# Patient Record
Sex: MALE | Race: WHITE | NOT HISPANIC OR LATINO | Employment: UNEMPLOYED | URBAN - METROPOLITAN AREA
[De-identification: names, ages, dates, MRNs, and addresses within clinical notes are randomized per-mention and may not be internally consistent; named-entity substitution may affect disease eponyms.]

---

## 2023-05-31 ENCOUNTER — HOSPITAL ENCOUNTER (EMERGENCY)
Facility: HOSPITAL | Age: 18
Discharge: HOME | End: 2023-05-31
Attending: EMERGENCY MEDICINE
Payer: COMMERCIAL

## 2023-05-31 VITALS
BODY MASS INDEX: 24.75 KG/M2 | TEMPERATURE: 99 F | SYSTOLIC BLOOD PRESSURE: 129 MMHG | HEART RATE: 84 BPM | OXYGEN SATURATION: 99 % | DIASTOLIC BLOOD PRESSURE: 62 MMHG | HEIGHT: 64 IN | WEIGHT: 145 LBS | RESPIRATION RATE: 16 BRPM

## 2023-05-31 DIAGNOSIS — L57.8 DERMATITIS DUE TO SUNBURN: ICD-10-CM

## 2023-05-31 DIAGNOSIS — L03.119 CELLULITIS OF LOWER EXTREMITY, UNSPECIFIED LATERALITY: Primary | ICD-10-CM

## 2023-05-31 PROCEDURE — 25000003 PHARM REV CODE 250: Performed by: EMERGENCY MEDICINE

## 2023-05-31 PROCEDURE — 99283 EMERGENCY DEPT VISIT LOW MDM: CPT

## 2023-05-31 RX ORDER — DOXYCYCLINE HYCLATE 100 MG
100 TABLET ORAL
Status: COMPLETED | OUTPATIENT
Start: 2023-05-31 | End: 2023-05-31

## 2023-05-31 RX ORDER — DOXYCYCLINE 100 MG/1
100 CAPSULE ORAL 2 TIMES DAILY
Qty: 20 CAPSULE | Refills: 0 | Status: SHIPPED | OUTPATIENT
Start: 2023-05-31 | End: 2023-06-10

## 2023-05-31 RX ADMIN — DOXYCYCLINE HYCLATE 100 MG: 100 TABLET, COATED ORAL at 04:05

## 2023-05-31 NOTE — Clinical Note
"Pedro Pablo Diehl" Paty was seen and treated in our emergency department on 5/31/2023.  He may return to work on 06/14/2023.       If you have any questions or concerns, please don't hesitate to call.      Mel Harrell RN    "
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
## 2023-06-02 ENCOUNTER — HOSPITAL ENCOUNTER (OUTPATIENT)
Dept: WOUND CARE | Facility: HOSPITAL | Age: 18
Discharge: HOME | End: 2023-06-02
Attending: PREVENTIVE MEDICINE
Payer: COMMERCIAL

## 2023-06-02 VITALS
TEMPERATURE: 99 F | HEART RATE: 89 BPM | SYSTOLIC BLOOD PRESSURE: 121 MMHG | DIASTOLIC BLOOD PRESSURE: 79 MMHG | BODY MASS INDEX: 24.75 KG/M2 | WEIGHT: 145 LBS | HEIGHT: 64 IN

## 2023-06-02 DIAGNOSIS — L25.9 CONTACT DERMATITIS, UNSPECIFIED CONTACT DERMATITIS TYPE, UNSPECIFIED TRIGGER: ICD-10-CM

## 2023-06-02 DIAGNOSIS — L57.8 DERMATITIS DUE TO SUNBURN: Primary | ICD-10-CM

## 2023-06-02 PROCEDURE — 99204 OFFICE O/P NEW MOD 45 MIN: CPT
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
## 2023-06-09 ENCOUNTER — HOSPITAL ENCOUNTER (OUTPATIENT)
Dept: WOUND CARE | Facility: HOSPITAL | Age: 18
Discharge: HOME | End: 2023-06-09
Attending: PREVENTIVE MEDICINE
Payer: COMMERCIAL

## 2023-06-09 VITALS
DIASTOLIC BLOOD PRESSURE: 59 MMHG | HEART RATE: 90 BPM | HEIGHT: 64 IN | WEIGHT: 145 LBS | TEMPERATURE: 96 F | SYSTOLIC BLOOD PRESSURE: 115 MMHG | BODY MASS INDEX: 24.75 KG/M2

## 2023-06-09 DIAGNOSIS — L25.9 CONTACT DERMATITIS, UNSPECIFIED CONTACT DERMATITIS TYPE, UNSPECIFIED TRIGGER: ICD-10-CM

## 2023-06-09 DIAGNOSIS — L57.8 DERMATITIS DUE TO SUNBURN: Primary | ICD-10-CM

## 2023-06-09 PROCEDURE — 11042 DBRDMT SUBQ TIS 1ST 20SQCM/<: CPT

## 2023-06-09 PROCEDURE — 99213 OFFICE O/P EST LOW 20 MIN: CPT
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
